# Patient Record
Sex: FEMALE | Race: WHITE | NOT HISPANIC OR LATINO | Employment: OTHER | URBAN - METROPOLITAN AREA
[De-identification: names, ages, dates, MRNs, and addresses within clinical notes are randomized per-mention and may not be internally consistent; named-entity substitution may affect disease eponyms.]

---

## 2023-02-17 PROBLEM — R26.89 BALANCE PROBLEM: Status: ACTIVE | Noted: 2023-02-17

## 2023-02-17 PROBLEM — R92.30 DENSE BREASTS: Status: ACTIVE | Noted: 2023-02-17

## 2023-02-17 PROBLEM — F41.9 ANXIETY: Status: ACTIVE | Noted: 2023-02-17

## 2023-02-17 PROBLEM — L21.9 SEBORRHEIC DERMATITIS: Status: ACTIVE | Noted: 2023-02-17

## 2023-02-17 PROBLEM — M19.042 OSTEOARTHRITIS OF BOTH HANDS: Status: ACTIVE | Noted: 2023-02-17

## 2023-02-17 PROBLEM — R82.90 URINE ABNORMALITY: Status: ACTIVE | Noted: 2023-02-17

## 2023-02-17 PROBLEM — M85.852 OSTEOPENIA OF LEFT HIP: Status: ACTIVE | Noted: 2023-02-17

## 2023-02-17 PROBLEM — R31.29 OTHER MICROSCOPIC HEMATURIA: Status: ACTIVE | Noted: 2023-02-17

## 2023-02-17 PROBLEM — M19.032 OSTEOARTHRITIS OF BOTH WRISTS: Status: ACTIVE | Noted: 2023-02-17

## 2023-02-17 PROBLEM — T45.8X5A: Status: ACTIVE | Noted: 2023-02-17

## 2023-02-17 PROBLEM — E55.9 VITAMIN D INSUFFICIENCY: Status: ACTIVE | Noted: 2023-02-17

## 2023-02-17 PROBLEM — Z98.890 HISTORY OF BUNIONECTOMY: Status: ACTIVE | Noted: 2023-02-17

## 2023-02-17 PROBLEM — R63.6 UNDERWEIGHT: Status: ACTIVE | Noted: 2023-02-17

## 2023-02-17 PROBLEM — M19.90 OSTEOARTHRITIS: Status: ACTIVE | Noted: 2023-02-17

## 2023-02-17 PROBLEM — F51.05 INSOMNIA SECONDARY TO ANXIETY: Status: ACTIVE | Noted: 2023-02-17

## 2023-02-17 PROBLEM — M75.42 IMPINGEMENT SYNDROME, SHOULDER, LEFT: Status: ACTIVE | Noted: 2023-02-17

## 2023-02-17 PROBLEM — I10 BENIGN ESSENTIAL HYPERTENSION: Status: ACTIVE | Noted: 2023-02-17

## 2023-02-17 PROBLEM — F41.9 INSOMNIA SECONDARY TO ANXIETY: Status: ACTIVE | Noted: 2023-02-17

## 2023-02-17 PROBLEM — E78.00 ELEVATED LDL CHOLESTEROL LEVEL: Status: ACTIVE | Noted: 2023-02-17

## 2023-02-17 PROBLEM — R41.3 MEMORY DYSFUNCTION: Status: ACTIVE | Noted: 2023-02-17

## 2023-02-17 PROBLEM — R41.89 COGNITIVE IMPAIRMENT: Status: ACTIVE | Noted: 2023-02-17

## 2023-02-17 PROBLEM — G30.9 ALZHEIMER'S DISEASE (MULTI): Status: ACTIVE | Noted: 2023-02-17

## 2023-02-17 PROBLEM — K64.4 EXTERNAL HEMORRHOID: Status: ACTIVE | Noted: 2023-02-17

## 2023-02-17 PROBLEM — M79.673 FOOT PAIN: Status: ACTIVE | Noted: 2023-02-17

## 2023-02-17 PROBLEM — K21.9 ESOPHAGEAL REFLUX: Status: ACTIVE | Noted: 2023-02-17

## 2023-02-17 PROBLEM — N95.2 POSTMENOPAUSAL ATROPHIC VAGINITIS: Status: ACTIVE | Noted: 2023-02-17

## 2023-02-17 PROBLEM — J30.9 ALLERGIC RHINITIS: Status: ACTIVE | Noted: 2023-02-17

## 2023-02-17 PROBLEM — M19.031 OSTEOARTHRITIS OF BOTH WRISTS: Status: ACTIVE | Noted: 2023-02-17

## 2023-02-17 PROBLEM — R92.2 DENSE BREASTS: Status: ACTIVE | Noted: 2023-02-17

## 2023-02-17 PROBLEM — M19.041 OSTEOARTHRITIS OF BOTH HANDS: Status: ACTIVE | Noted: 2023-02-17

## 2023-02-17 PROBLEM — M81.0 OSTEOPOROSIS: Status: ACTIVE | Noted: 2023-02-17

## 2023-02-17 PROBLEM — F02.80 ALZHEIMER'S DISEASE (MULTI): Status: ACTIVE | Noted: 2023-02-17

## 2023-02-17 RX ORDER — PROPRANOLOL HYDROCHLORIDE 60 MG/1
60 CAPSULE, EXTENDED RELEASE ORAL
COMMUNITY
Start: 2020-03-18 | End: 2023-06-08 | Stop reason: SDUPTHER

## 2023-02-17 RX ORDER — VIT C/E/ZN/COPPR/LUTEIN/ZEAXAN 250MG-90MG
2 CAPSULE ORAL
COMMUNITY

## 2023-02-17 RX ORDER — FLAXSEED OIL 1000 MG
1 CAPSULE ORAL DAILY
COMMUNITY

## 2023-02-17 RX ORDER — DENOSUMAB 60 MG/ML
1 INJECTION SUBCUTANEOUS
COMMUNITY
Start: 2016-06-28

## 2023-02-17 RX ORDER — ACETAMINOPHEN 500 MG
50 TABLET ORAL DAILY
COMMUNITY
Start: 2018-07-16 | End: 2023-06-08 | Stop reason: SDUPTHER

## 2023-02-17 RX ORDER — ALUMINUM ZIRCONIUM OCTACHLOROHYDREX GLY 16 G/100G
1 GEL TOPICAL DAILY
COMMUNITY
Start: 2017-10-02 | End: 2023-04-05 | Stop reason: WASHOUT

## 2023-02-17 RX ORDER — SERTRALINE HYDROCHLORIDE 50 MG/1
50 TABLET, FILM COATED ORAL DAILY
COMMUNITY
End: 2023-08-16 | Stop reason: SDUPTHER

## 2023-02-17 RX ORDER — DONEPEZIL HYDROCHLORIDE 10 MG/1
1 TABLET, FILM COATED ORAL NIGHTLY
COMMUNITY
Start: 2022-06-23 | End: 2023-06-08 | Stop reason: SDUPTHER

## 2023-02-17 RX ORDER — FERROUS SULFATE, DRIED 160(50) MG
1 TABLET, EXTENDED RELEASE ORAL DAILY
COMMUNITY
Start: 2012-06-13

## 2023-02-17 RX ORDER — BENAZEPRIL HYDROCHLORIDE 5 MG/1
1 TABLET ORAL EVERY EVENING
COMMUNITY
Start: 2020-04-02 | End: 2023-06-08 | Stop reason: SDUPTHER

## 2023-04-04 NOTE — PROGRESS NOTES
Subjective   Patient ID: Shannan Merino is a 82 y.o. female who presents for Follow-up (Pt here for follow up and review. Pt is accompanied by her sister. Pt denies any new problems or concerns at this time. ).  LAST VISIT PLAN 1/12/23  Provider Impressions    Medicare wellness visit completed   also: Alzheimer's disease, dementia-is argumentative and agitated today, anger directed at sister but this was intermittent and she contradicted many of her statements during the visit.  She almost left twice waiting, as not sure why she was there. she did not recall doing this when I went into the room-denied trying or wanting to leave.     Sister seems worn out and frustrated with all of this. sister does not live with her but helps her with transportation, meds, paying bills. pt does not want to hear anything about leaving her home and her acreage. sister thinks she is ok to stay there for now. has her dog whom she oves and would not be able to take the dog with her.  pt wants to drive. I gave pt info to set up appt with drive team. d/w her that her ability to set up the appt. and get it done was part of my evaluation of her abilities. she was not to have her sister set this up. If she is unable to set up the appt. for driving eval, then she is not able to drive. (has phone number to call). I do not feel she is capable of doing this, will see.  recommend follow up neuropsych eval, reception will schedule.    diarrhea attime, stop align and see if better.  acne like lesions she is scratching at , will trial sulfa lotion for acne and see if it improves.  could be habit.    HTN stable    Elevated LDL cholesterol level continue statin  osteoporosis, on prolia, dexa due after april 2023  Anxiety / Insomnia secondary to anxiety-that seems to be ok.  weigh tloss, concerned about her eating habits.   sister was setting u pmeals on wheels and they apparently did come to the house. pt was upset stster asked who came to the house  "as \"it was none of your business\" (but it was as sister set it up).  consider adding mirtazapine but may be an issue with timing of med. will continue to monitor.  **Patient Discussion/Summary  MEDICATIONS:  Stop Align and see how bowels are without it.  Otherwise Keep same medications.  TESTING:  blood tests in november were good.  april 2023 your bone density is due.  REFERRALS:  Make an appointment with the neuropsychologist either in [place] or in [place].  Drive Team senior driving evaluation.  Call Drive Team to arrange a senior driving evaluation and have the report sent to me.  Phone: [phone] [address] [place] [address]  Fax: [phone]  Email: [email or web address]  Web: [employer]  or  can do this through occupational therapy but it is up in [facility] so pretty far away.  We agreed no mammograms or colonoscopies unless you are having a new problem.  FOLLOW UP WITH DR. GUTIERREZ:  Next visit: 2 months check weight and memory  END INFO FROM PRIOR VISIT  HPI  Pt is here for follow up after neuropsych testing and on her htn and dementia.  Concerns for her dementia are as follows:  Poor insight on part of pt  Not eating well and losing weight  Still lives alone -a concern  Sister has hc and financial poa and sorts her meds but pt has to take them. Sister feels this is working fairly well.   Pt does not understand why she cannot drive  She itches/scratches at skin.  She spends a lot of time outside and has minor scrapes-is not as safe as would like her to be-we discussed not getting up on ladders.    She is on aricept s 10mg since June 2022.   Sister feels pts memory is worse    Pt brings up issue of driving. I have informed pt she cannot drive, is not safe with her reaction time and dementia  Pt is not happy with this and feels she is then a prisoner at home and has to rely on her sister who sleeps in day and is up in evenings (this is a chronic complaint). Pt was " "given name and contact info for drive team at her last appt. And part of the evaluation was for her to be capable of making the appt. She denies solo hough this info -I handed it to her myself at the last appt.    Scribe Transcription:  Elevated chol:She has not been on pravastatin since March 2022-stopped to see if her memory would improve, and her chol was 183  without medication in November 2022.     She has a cut on her right index finger. She also has abrasions with light scabs in the pretibial area with pink around them. No cellulitis. Sister points these out. Pt did not point them out.    She saw neuropsychology and I don’t have a report yet. She was told her report wouldn’t be ready until April 12.     Review of Systems    Cardiac: No CP , palpitations, increased marin  Resp: No sob, wheezing, cough  Extremities: No leg or ankle swelling, no claudication  Neuro: No dizziness, syncope, blurred vision. No new headaches.    Objective   Lab Results   Component Value Date    WBC 11.2 11/22/2022    HGB 14.0 11/22/2022    HCT 44.2 11/22/2022     11/22/2022    CHOL 183 11/22/2022    TRIG 131 11/22/2022    HDL 59.9 11/22/2022    ALT 11 11/22/2022    AST 18 11/22/2022     11/22/2022    K 4.3 11/22/2022     11/22/2022    CREATININE 0.90 11/22/2022    BUN 14 11/22/2022    CO2 31 11/22/2022    TSH 3.50 11/22/2022       Physical Exam  Constitutional:           Comments: Scab and mild inflammation both pre tib areas     BP (!) 114/48 (BP Location: Right arm, Patient Position: Sitting, BP Cuff Size: Adult)   Pulse 64   Resp 16   Ht 1.549 m (5' 1\")   Wt 42.6 kg (94 lb)   BMI 17.76 kg/m²       1/11/2022     3:44 PM 3/14/2022     3:48 PM 3/14/2022     4:58 PM 6/23/2022     3:29 PM 10/31/2022    11:49 AM 10/31/2022    12:51 PM 4/5/2023     3:02 PM   Vitals   Systolic 143 109 128 115 96 118 114   Diastolic 66 57 72 49 52 64 48   Heart Rate 69 63  57 60  64   Resp  18   18  16   Height (in)  1.6 m (5' 3\")    " " 1.549 m (5' 1\")   Weight (lb) 100.06 98  97.06 99  94   BMI 17.73 kg/m2 17.36 kg/m2  17.19 kg/m2 17.54 kg/m2  17.76 kg/m2   BSA (m2) 1.42 m2 1.41 m2  1.4 m2 1.41 m2  1.35 m2   Visit Report       Report       Patient looks her usual self, frail appearing , talkative,  mildly agitated when does not like what is said (driving for example).l and is in no obvious distress.  HEENT:   Normocephalic, no facial asymmetry  Eyes: Sclera and conjunctiva are clear.  Neck: No adenopathy cervical (Ant/post/lat), no Supraclavicular nodes.   Thyroid normal.   Carotid pulses normal, no carotid bruits.  Lungs : RR normal. Clear to auscultation anterior, posterior and lateral. No rales, wheezes rhonchi or rubs. Good air exchange.  Heart: RRR. No Murmur, gallop, click or rub.  Abdomen: Bowel sounds normal, No bruits. No pulsatile mass. No hepatosplenomegaly, masses or tenderness. Soft, no guarding.  Extremities: no upper extremity edema. No lower extremity edema.   Musculoskeletal: no synovitis of joints seen. No new deformity.  Neuro: CN 2-12 intact. Alert, appropriate. Not oriented to date. Did not recall at first that she recently had a neuropsych eval, argumentative about it. Repeats herself frequently due to short term memory issues. Repeats similar items to what she has said at last visit.   Ambulates independently.  No gross motor deficit.   No tremors.  Psych: normal mood and affect.  Skin: No rash, bruising petechiae or jaundice.see diagram for abrasions with mild inflammation.    Assessment/Plan   Problem List Items Addressed This Visit          Nervous    Alzheimer's disease (CMS/HCC)    Relevant Orders    Follow Up In [facility] - PCP  Same med , await neuropsych eval report  She should not be driving. She will not be able to make the call for the appt. , this should not be an issue. Sister is not going to set up appt. Pt will need to do it.       Circulatory    Benign essential hypertension - Primary    Relevant " Orders    Follow Up In [facility] - PCP/same meds     Other Visit Diagnoses       Abrasion of anterior left lower leg, initial encounter        Abrasion, right lower leg, initial encounter        Laceration of blood vessel of right index finger, initial encounter        Cellulitis of lower extremity, unspecified laterality       Atb topical and oral.

## 2023-04-05 ENCOUNTER — OFFICE VISIT (OUTPATIENT)
Dept: PRIMARY CARE | Facility: CLINIC | Age: 83
End: 2023-04-05
Payer: MEDICARE

## 2023-04-05 VITALS
DIASTOLIC BLOOD PRESSURE: 48 MMHG | BODY MASS INDEX: 17.75 KG/M2 | WEIGHT: 94 LBS | SYSTOLIC BLOOD PRESSURE: 114 MMHG | HEIGHT: 61 IN | RESPIRATION RATE: 16 BRPM | HEART RATE: 64 BPM

## 2023-04-05 DIAGNOSIS — S80.811A ABRASION, RIGHT LOWER LEG, INITIAL ENCOUNTER: ICD-10-CM

## 2023-04-05 DIAGNOSIS — I10 BENIGN ESSENTIAL HYPERTENSION: Primary | ICD-10-CM

## 2023-04-05 DIAGNOSIS — F02.80 ALZHEIMER'S DISEASE (MULTI): ICD-10-CM

## 2023-04-05 DIAGNOSIS — S65.510A LACERATION OF BLOOD VESSEL OF RIGHT INDEX FINGER, INITIAL ENCOUNTER: ICD-10-CM

## 2023-04-05 DIAGNOSIS — L03.119 CELLULITIS OF LOWER EXTREMITY, UNSPECIFIED LATERALITY: ICD-10-CM

## 2023-04-05 DIAGNOSIS — G30.9 ALZHEIMER'S DISEASE (MULTI): ICD-10-CM

## 2023-04-05 DIAGNOSIS — S80.812A ABRASION OF ANTERIOR LEFT LOWER LEG, INITIAL ENCOUNTER: ICD-10-CM

## 2023-04-05 PROBLEM — E78.00 HYPERCHOLESTEROLEMIA: Status: ACTIVE | Noted: 2023-04-05

## 2023-04-05 PROCEDURE — 99214 OFFICE O/P EST MOD 30 MIN: CPT | Performed by: INTERNAL MEDICINE

## 2023-04-05 PROCEDURE — 1160F RVW MEDS BY RX/DR IN RCRD: CPT | Performed by: INTERNAL MEDICINE

## 2023-04-05 PROCEDURE — 1036F TOBACCO NON-USER: CPT | Performed by: INTERNAL MEDICINE

## 2023-04-05 PROCEDURE — 3074F SYST BP LT 130 MM HG: CPT | Performed by: INTERNAL MEDICINE

## 2023-04-05 PROCEDURE — 1159F MED LIST DOCD IN RCRD: CPT | Performed by: INTERNAL MEDICINE

## 2023-04-05 PROCEDURE — 3078F DIAST BP <80 MM HG: CPT | Performed by: INTERNAL MEDICINE

## 2023-04-05 RX ORDER — CEPHALEXIN 500 MG/1
500 CAPSULE ORAL 2 TIMES DAILY
Qty: 14 CAPSULE | Refills: 0 | Status: SHIPPED | OUTPATIENT
Start: 2023-04-05 | End: 2023-04-12

## 2023-04-05 RX ORDER — AMOXICILLIN 500 MG/1
CAPSULE ORAL
COMMUNITY
Start: 2023-03-09

## 2023-04-05 ASSESSMENT — PAIN SCALES - GENERAL: PAINLEVEL: 0-NO PAIN

## 2023-04-05 NOTE — PATIENT INSTRUCTIONS
Cephalexin 500 mg one tab twice per day for 7 days.    Bacitracin with zinc to scabs on the skin twice per day.First use a warm wet compress to soften the scab, then pat dry, then apply bacitracin with zinc.  Also apply bacitracin to the cut on your finger.      Follow up in 3 months on medications.    I recommend a medical alert button that you where.    No driving.  You can call Drive Team to have them set up an evaluation.  We gave you a paper about this last time.   We also need to see the report from the neuropsychologist that you saw recently, before we can discuss driving.

## 2023-04-06 ENCOUNTER — DOCUMENTATION (OUTPATIENT)
Dept: PRIMARY CARE | Facility: CLINIC | Age: 83
End: 2023-04-06
Payer: MEDICARE

## 2023-04-06 NOTE — PROGRESS NOTES
I have a signed consent form stating Shannan agrees to receive Medicare Chronic Care Management Services.

## 2023-04-17 ENCOUNTER — PATIENT OUTREACH (OUTPATIENT)
Dept: PRIMARY CARE | Facility: CLINIC | Age: 83
End: 2023-04-17
Payer: MEDICARE

## 2023-04-17 DIAGNOSIS — I10 HYPERTENSION, UNSPECIFIED TYPE: ICD-10-CM

## 2023-04-17 DIAGNOSIS — F02.80 ALZHEIMER'S DISEASE (MULTI): ICD-10-CM

## 2023-04-17 DIAGNOSIS — G30.9 ALZHEIMER'S DISEASE (MULTI): ICD-10-CM

## 2023-04-17 PROCEDURE — 99490 CHRNC CARE MGMT STAFF 1ST 20: CPT | Performed by: INTERNAL MEDICINE

## 2023-04-17 NOTE — PROGRESS NOTES
"I called and spoke with the patient about a care plan. I went over in detail what the care plan includes and she verbally approved of the care plan. She states her sister is her medical power of  and her sister helps her with a lot of things. She will take her grocery shopping or out to eat, and also makes sure her medications are correct and that she has them all. Patients states she can still \"work like a horse\" so the dementia is awful because she would much rather be out working instead of home all day. She says she would like to drive again soon, but it doesn't seem she is allowed to right now. She does not need any transportation or medication refills right now. I told her to call me if she does need anything and she wrote down my contact information. I will reach out to her a couple times a month to make sure she is doing fine.  "

## 2023-05-17 ENCOUNTER — PATIENT OUTREACH (OUTPATIENT)
Dept: PRIMARY CARE | Facility: CLINIC | Age: 83
End: 2023-05-17
Payer: MEDICARE

## 2023-05-17 ENCOUNTER — TELEPHONE (OUTPATIENT)
Dept: PRIMARY CARE | Facility: CLINIC | Age: 83
End: 2023-05-17

## 2023-05-17 DIAGNOSIS — I10 BENIGN ESSENTIAL HYPERTENSION: ICD-10-CM

## 2023-05-17 DIAGNOSIS — M19.90 OSTEOARTHRITIS, UNSPECIFIED OSTEOARTHRITIS TYPE, UNSPECIFIED SITE: ICD-10-CM

## 2023-05-17 PROCEDURE — 99490 CHRNC CARE MGMT STAFF 1ST 20: CPT | Performed by: INTERNAL MEDICINE

## 2023-05-17 NOTE — PROGRESS NOTES
I called and talked to the patient about her arthritis and her blood pressure. She said her blood pressure has not been elevated lately so she does not take it very often and her whole body aches because of the arthritis but she finds that keeping active helps with that. She says her sister handles all of her medications and will give me a call if there is anything she needs.

## 2023-05-18 DIAGNOSIS — M81.0 OSTEOPOROSIS, UNSPECIFIED OSTEOPOROSIS TYPE, UNSPECIFIED PATHOLOGICAL FRACTURE PRESENCE: ICD-10-CM

## 2023-05-18 DIAGNOSIS — M81.0 AGE-RELATED OSTEOPOROSIS WITHOUT CURRENT PATHOLOGICAL FRACTURE: Primary | ICD-10-CM

## 2023-06-08 ENCOUNTER — TELEPHONE (OUTPATIENT)
Dept: PRIMARY CARE | Facility: CLINIC | Age: 83
End: 2023-06-08

## 2023-06-08 ENCOUNTER — LAB (OUTPATIENT)
Dept: LAB | Facility: LAB | Age: 83
End: 2023-06-08
Payer: MEDICARE

## 2023-06-08 DIAGNOSIS — R41.3 MEMORY DYSFUNCTION: ICD-10-CM

## 2023-06-08 DIAGNOSIS — E55.9 VITAMIN D INSUFFICIENCY: ICD-10-CM

## 2023-06-08 DIAGNOSIS — R41.89 COGNITIVE IMPAIRMENT: ICD-10-CM

## 2023-06-08 DIAGNOSIS — I10 HYPERTENSION, UNSPECIFIED TYPE: ICD-10-CM

## 2023-06-08 DIAGNOSIS — M81.0 OSTEOPOROSIS, UNSPECIFIED OSTEOPOROSIS TYPE, UNSPECIFIED PATHOLOGICAL FRACTURE PRESENCE: ICD-10-CM

## 2023-06-08 PROCEDURE — 82310 ASSAY OF CALCIUM: CPT

## 2023-06-08 PROCEDURE — 82565 ASSAY OF CREATININE: CPT

## 2023-06-08 PROCEDURE — 84520 ASSAY OF UREA NITROGEN: CPT

## 2023-06-08 PROCEDURE — 36415 COLL VENOUS BLD VENIPUNCTURE: CPT

## 2023-06-08 NOTE — TELEPHONE ENCOUNTER
Pt needs a refill on Donepezil 10mg, benazepril 5mg, propranolol 60mg and cholecalciferol 50mg.  [facility] on [facility] [address]. [phone]  Thanks!

## 2023-06-09 LAB
CALCIUM (MG/DL) IN SER/PLAS: 9.7 MG/DL (ref 8.6–10.6)
CREATININE (MG/DL) IN SER/PLAS: 0.9 MG/DL (ref 0.5–1.05)
GFR FEMALE: 64 ML/MIN/1.73M2
UREA NITROGEN (MG/DL) IN SER/PLAS: 18 MG/DL (ref 6–23)

## 2023-06-09 RX ORDER — PROPRANOLOL HYDROCHLORIDE 60 MG/1
60 CAPSULE, EXTENDED RELEASE ORAL
Qty: 90 CAPSULE | Refills: 1 | Status: SHIPPED | OUTPATIENT
Start: 2023-06-09

## 2023-06-09 RX ORDER — DONEPEZIL HYDROCHLORIDE 10 MG/1
10 TABLET, FILM COATED ORAL NIGHTLY
Qty: 90 TABLET | Refills: 3 | Status: SHIPPED | OUTPATIENT
Start: 2023-06-09 | End: 2024-06-08

## 2023-06-09 RX ORDER — ACETAMINOPHEN 500 MG
50 TABLET ORAL DAILY
Qty: 90 CAPSULE | Refills: 3 | Status: SHIPPED | OUTPATIENT
Start: 2023-06-09

## 2023-06-09 RX ORDER — BENAZEPRIL HYDROCHLORIDE 5 MG/1
5 TABLET ORAL EVERY EVENING
Qty: 90 TABLET | Refills: 3 | Status: SHIPPED | OUTPATIENT
Start: 2023-06-09 | End: 2023-07-18 | Stop reason: DRUGHIGH

## 2023-06-12 ENCOUNTER — CLINICAL SUPPORT (OUTPATIENT)
Dept: PRIMARY CARE | Facility: CLINIC | Age: 83
End: 2023-06-12
Payer: MEDICARE

## 2023-06-12 VITALS — WEIGHT: 92 LBS | BODY MASS INDEX: 17.38 KG/M2

## 2023-06-12 DIAGNOSIS — M81.0 OSTEOPOROSIS, UNSPECIFIED OSTEOPOROSIS TYPE, UNSPECIFIED PATHOLOGICAL FRACTURE PRESENCE: ICD-10-CM

## 2023-06-12 PROCEDURE — 96372 THER/PROPH/DIAG INJ SC/IM: CPT | Performed by: INTERNAL MEDICINE

## 2023-06-12 NOTE — PROGRESS NOTES
Pt here for Prolia injection. Labs are done. Calcium 9.7mg/dl. Injection given per documentation. Pt tolerated well. Accompanied by sister.

## 2023-07-18 ENCOUNTER — OFFICE VISIT (OUTPATIENT)
Dept: PRIMARY CARE | Facility: CLINIC | Age: 83
End: 2023-07-18
Payer: MEDICARE

## 2023-07-18 ENCOUNTER — PATIENT OUTREACH (OUTPATIENT)
Dept: PRIMARY CARE | Facility: CLINIC | Age: 83
End: 2023-07-18

## 2023-07-18 VITALS
BODY MASS INDEX: 17.87 KG/M2 | RESPIRATION RATE: 16 BRPM | WEIGHT: 91 LBS | SYSTOLIC BLOOD PRESSURE: 98 MMHG | HEART RATE: 60 BPM | HEIGHT: 60 IN | DIASTOLIC BLOOD PRESSURE: 50 MMHG

## 2023-07-18 DIAGNOSIS — F02.80 ALZHEIMER'S DISEASE (MULTI): ICD-10-CM

## 2023-07-18 DIAGNOSIS — R41.89 COGNITIVE IMPAIRMENT: ICD-10-CM

## 2023-07-18 DIAGNOSIS — R21 RASH: Primary | ICD-10-CM

## 2023-07-18 DIAGNOSIS — G30.9 ALZHEIMER'S DISEASE (MULTI): ICD-10-CM

## 2023-07-18 DIAGNOSIS — E78.00 ELEVATED LDL CHOLESTEROL LEVEL: ICD-10-CM

## 2023-07-18 DIAGNOSIS — I10 BENIGN ESSENTIAL HYPERTENSION: ICD-10-CM

## 2023-07-18 DIAGNOSIS — E55.9 VITAMIN D INSUFFICIENCY: ICD-10-CM

## 2023-07-18 PROCEDURE — 3078F DIAST BP <80 MM HG: CPT | Performed by: INTERNAL MEDICINE

## 2023-07-18 PROCEDURE — 1159F MED LIST DOCD IN RCRD: CPT | Performed by: INTERNAL MEDICINE

## 2023-07-18 PROCEDURE — 1126F AMNT PAIN NOTED NONE PRSNT: CPT | Performed by: INTERNAL MEDICINE

## 2023-07-18 PROCEDURE — 3074F SYST BP LT 130 MM HG: CPT | Performed by: INTERNAL MEDICINE

## 2023-07-18 PROCEDURE — 99215 OFFICE O/P EST HI 40 MIN: CPT | Performed by: INTERNAL MEDICINE

## 2023-07-18 PROCEDURE — 1160F RVW MEDS BY RX/DR IN RCRD: CPT | Performed by: INTERNAL MEDICINE

## 2023-07-18 PROCEDURE — 1036F TOBACCO NON-USER: CPT | Performed by: INTERNAL MEDICINE

## 2023-07-18 PROCEDURE — 99490 CHRNC CARE MGMT STAFF 1ST 20: CPT | Performed by: INTERNAL MEDICINE

## 2023-07-18 NOTE — PROGRESS NOTES
I talked to the patient. It is sometimes difficult getting information from the patient due to her Alzheimers, but it sounds like she is doing ok. Her sister was with her and her sister said everything is going well. They are not in need of anything at this time. I made sure they have my phone number so they can reach out to me if they need anything.

## 2023-07-18 NOTE — PROGRESS NOTES
Subjective   Patient ID: Shannan Merino is a 83 y.o. female who presents for Follow-up (Pt here for follow up on Hypertension and hospital follow up. Saw Pineda today.).  LAST VISIT PLAN 4/05/2023  PATIENT'S DISCUSSION/SUMMARY:  Cephalexin 500 mg one tab twice per day for 7 days.  Bacitracin with zinc to scabs on the skin twice per day.First use a warm wet compress to soften the scab, then pat dry, then apply bacitracin with zinc.  Also apply bacitracin to the cut on your finger.  Follow up in 3 months on medications.  I recommend a medical alert button that you where.  No driving.  You can call Drive Team to have them set up an evaluation.  We gave you a paper about this last time.   We also need to see the report from the neuropsychologist that you saw recently, before we can discuss driving.      PROVIDER'S IMPRESSIONS:  Benign essential hypertension  Alzheimer's disease (CMS/HCC)  Abrasion of anterior left lower leg, initial encounter  Abrasion, right lower leg, initial encounter  Laceration of blood vessel of right index finger, initial encounter  Cellulitis of lower extremity, unspecified laterality  Orders Placed  Follow Up In [facility] - PCP  Medication Changes  cephalexin 500 mg oral 2 times daily  END INFO FROM PRIOR VISIT    HPI  Here for Follow-up on Hypertension and dementia. And watch weight. History of falls.  Sister is unaware of falls since last visit. Pt is cagey in her answer and denies she has fallen.    Denies dizziness, bp is running lower. Will stop all meds except propranolol for bp.  Stopping propranolol will be a little trickier as would prefer to wean but that will require a more than once per day dosing temporarily.    Is getting her prolia for osteoporosis. Bun creat in June ok.  Scribe Transcription:  Cardiac: No CP , palpitations, increased marin  Resp: No sob, wheezing, cough  Extremities: No leg or ankle swelling, no claudication  Neuro: No dizziness, syncope, blurred  vision. No new headaches.     Her weight is stable and her biggest complaint is the rash on her upper back that itches. It is only in the areas that she can reach and will see a dermatologist for this. It was more follicular before but now is more plaque-like. Her bowels are okay and she denies abdominal pain and heartburn.     When asked about falls, she stated “everyone falls, I’ve watched my neighbor fall.” and if she falls she is not telling her sister about it. She reports feeling still paranoid about her sister “being in control” and she got upset when I would talk to her sister during our visit (in Shannan's presence).. Her sister helps with medications and driving, and sister notes when she checks the pill box, she sees that  she occasionally misses her morning pills which mainly consist of vitamins and she is consistent with her night time medications.    Her bp is low but we will keep her on propranolol.     We discussed Boost to increase her weight but she doesn’t remember to drink it. Her sister doesn’t see a solution to that problem at this point.     Review of Systems  No other complaints  Objective   Lab Results   Component Value Date    WBC 11.2 11/22/2022    HGB 14.0 11/22/2022    HCT 44.2 11/22/2022     11/22/2022    CHOL 183 11/22/2022    TRIG 131 11/22/2022    HDL 59.9 11/22/2022    ALT 11 11/22/2022    AST 18 11/22/2022     11/22/2022    K 4.3 11/22/2022     11/22/2022    CREATININE 0.90 06/08/2023    BUN 18 06/08/2023    CO2 31 11/22/2022    TSH 3.50 11/22/2022     Physical ExamBP 98/50 (BP Location: Left arm, Patient Position: Sitting, BP Cuff Size: Adult)   Pulse 60   Resp 16   Ht 1.524 m (5')   Wt (!) 41.3 kg (91 lb)   BMI 17.77 kg/m²       6/23/2022     3:29 PM 10/31/2022    11:49 AM 10/31/2022    12:51 PM 1/12/2023     3:46 PM 4/5/2023     3:02 PM 6/12/2023     3:30 PM 7/18/2023     2:21 PM   Vitals   Systolic 115 96 118 122 114  98   Diastolic 49 52 64 62 48  50  "  Heart Rate 57 60  60 64  60   Resp  18  16 16  16   Height (in)    1.549 m (5' 1\") 1.549 m (5' 1\")  1.524 m (5')   Weight (lb) 97.06 99  95 94 92 91   BMI 17.19 kg/m2 17.54 kg/m2  17.95 kg/m2 17.76 kg/m2 17.38 kg/m2 17.77 kg/m2   BSA (m2) 1.4 m2 1.41 m2  1.36 m2 1.35 m2 1.34 m2 1.32 m2   Visit Report     Report  Report       Patient looks frail. and is in no obvious distress.  Short term memory is poor.  She saw eye doctor recently at [facility] and is getting new glasses.    Sister states she loses her glasses a lot  Asked parts of the mmse:  Answers:  Date:17th June  Year:\" maybe 33--no 23 \"for year.  Knew me  and he sister.  Does not know her meds but thinks she does.  Pt states she has  no memory problems, (no insight into condition).  A little paranoid about her sister.  Repeats herself in the visit.      HEENT:   Normocephalic, no facial asymmetry  Eyes: Sclera and conjunctiva are clear.  Neck: No adenopathy cervical (Ant/post/lat), no Supraclavicular nodes.   Thyroid normal.  Carotid pulses normal, no carotid bruits.  Lungs : RR normal. Clear to auscultation anterior, posterior and lateral. No rales, wheezes rhonchi or rubs. Good air exchange.  Heart: RRR. No Murmur, gallop, click or rub.  Abdomen: Bowel sounds normal, No bruits. No pulsatile mass. No hepatosplenomegaly, masses or tenderness. Soft, no guarding.  Vascular:  Posterior tibialis pulses within normal limits bilaterally.   Extremities: no upper extremity edema. No lower extremity edema.   Musculoskeletal: no synovitis of joints seen. No new deformity.  Neuro: CN 2-12 intact. Alert, appropriate.  Ambulates independently.  No gross motor deficit.   No tremors.  Psych: normal mood and affect.  Skin: No , bruising petechiae or jaundice. Has a dry somewhat plaque like rash upper back, in areas where she can reach to scratch but not in other areas.    Assessment/Plan   Problem List Items Addressed This Visit       Alzheimer's disease (CMS/HCC)    " "Relevant Orders    TSH with reflex to Free T4 if abnormal    Vitamin B12    Benign essential hypertension  stop benazepril. Bp is too low. Continue propranolol for now.    Relevant Orders    CBC and Auto Differential    Lipid Panel    Comprehensive Metabolic Panel    Elevated LDL cholesterol level    Relevant Orders    TSH with reflex to Free T4 if abnormal    Cholesterol, LDL Direct    Cognitive impairment    Vitamin D insufficiency    Relevant Orders    Vitamin D 25-Hydroxy,Total     Other Visit Diagnoses       Rash    -  Primary    Relevant Orders    Referral to Dermatology           Dementia is same  not improved, continue meds-some paranoia. I do not think she is going to be able to stay in her own susie alone long term but seems to be ok for now, has not wandered off, is maintaining her weight. No injuries since last visit.   Bp stop the lotensin. If stays low consider weaning propranolol.  Rash refer derm    Instructions:  \"Stop benazepril.  Keep other medications sindhu.e  Refer to dermatology about the rash neck and upper back: Dr Conner Ball, has office in the [facility] building down the street from our office.    Follow up 3 months.  Will plan on flu shot at that visit.  Fasting blood test the week before your visit.  Drink 5 cups of water a day.  Ensure or boost or similar once per day to boost your calories.    Please have someone contact Shannan Weinstein's sister to set up proxy My Chart account for Shannan.\"    She will be due for some labs.    "

## 2023-07-18 NOTE — PATIENT INSTRUCTIONS
Stop benazepril.  Keep other medications sindhu.e  Refer to dermatology about the rash neck and upper back: Dr Conner Ball, has office in the [facility] building down the street from our office.    Follow up 3 months.  Will plan on flu shot at that visit.  Fasting blood test the week before your visit.  Drink 5 cups of water a day.  Ensure or boost or similar once per day to boost your calories.    Please have someone contact Corinna Shannan's sister to set up proxy My Chart account for Shannan.

## 2023-08-16 DIAGNOSIS — F41.9 ANXIETY: ICD-10-CM

## 2023-08-16 NOTE — TELEPHONE ENCOUNTER
Med refill from voicemail called in by  Corinna Merino (sister) Health Power of     sertraline (Zoloft) 50 mg tablet   Take 1 tablet (50 mg) by mouth once daily.     90 days    Tiffanie Harris    BN

## 2023-08-17 RX ORDER — SERTRALINE HYDROCHLORIDE 50 MG/1
50 TABLET, FILM COATED ORAL DAILY
Qty: 90 TABLET | Refills: 0 | Status: SHIPPED | OUTPATIENT
Start: 2023-08-17

## 2023-08-21 ENCOUNTER — PATIENT OUTREACH (OUTPATIENT)
Dept: PRIMARY CARE | Facility: CLINIC | Age: 83
End: 2023-08-21
Payer: MEDICARE

## 2023-08-21 DIAGNOSIS — F02.80 ALZHEIMER'S DISEASE (MULTI): ICD-10-CM

## 2023-08-21 DIAGNOSIS — G30.9 ALZHEIMER'S DISEASE (MULTI): ICD-10-CM

## 2023-08-21 DIAGNOSIS — I10 BENIGN ESSENTIAL HYPERTENSION: ICD-10-CM

## 2023-08-21 PROCEDURE — 99490 CHRNC CARE MGMT STAFF 1ST 20: CPT | Performed by: INTERNAL MEDICINE

## 2023-08-21 NOTE — PROGRESS NOTES
"I called and talked to the patient about her blood pressure and whether she stopped taking the benazepril or not. She said she is not sure but her sister handles all of the medications. I received permission from the patient to contact her sister to discuss her care if necessary. I asked the patient if she had started drinking Boost or Ensure drinks like Dr. Dasilva had asked. She said she was not aware she needed to do this but she has \"no problems eating\" and states she is gaining weight every day. She discussed how she was frustrated she cannot drive anymore but says she is going to change that soon by taking a test. I called her sister, Corinna, and asked how everything was going. She said she would like Dr. Dasilva to call her back and let her know what she thinks of Shannan's mental state and try to come up with a plan with her. Shannan says she is just fine living at home, but Corinna says she is deteriorating.   "

## 2023-09-27 ENCOUNTER — PATIENT OUTREACH (OUTPATIENT)
Dept: PRIMARY CARE | Facility: CLINIC | Age: 83
End: 2023-09-27
Payer: MEDICARE

## 2023-09-27 NOTE — PROGRESS NOTES
I called and got a hold of the patient's sister Corinna. Corinna said the patient was admitted to the [facility] at [place] for Memory Care on September 19th. The patient's care will now be handled by the memory care staff so she is no longer eligible for Chronic Care Management through Dr. Dasilva.

## 2023-11-06 ENCOUNTER — APPOINTMENT (OUTPATIENT)
Dept: PRIMARY CARE | Facility: CLINIC | Age: 83
End: 2023-11-06
Payer: MEDICARE

## 2024-01-15 ENCOUNTER — APPOINTMENT (OUTPATIENT)
Dept: PRIMARY CARE | Facility: CLINIC | Age: 84
End: 2024-01-15
Payer: MEDICARE